# Patient Record
(demographics unavailable — no encounter records)

---

## 2025-03-19 NOTE — HISTORY OF PRESENT ILLNESS
[FreeTextEntry1] : 49 y/o  LMP (occasional light spotting with Mirena iud since 10/2023) pt presents today for a routine GYN exam. Requesting something to help with her anxiety, hx flight anxiety. Taking her son to look at [employer] in [place] next week.   s/p Dx Hysteroscopy, D&C with DR. Lpoez 2023.   Demographics:  Race: White Ethnicity: Not  or  Native Language: English Occupation: Teacher  : 4  Para:  3 0 1 3   OB History: D&E @ 20 weeks for T21 fetus,  x3  Year:  Mode Deliv:  Sex: M WT: 6 LBS 14OZ  Year:  Mode Deliv:  Sex: M  GYN OVARIAN CYST, INFERTILITY Cycle: Monthly Duration: 5 days Flow: Normal Sexually Active   Type of Contraception: Vasectomy  PMH No significant past medical history.  Accepts blood products  Surgical History: D&E, D&C 20 week Tri 21  Allergies: PCN, sulfur   [Patient reported mammogram was normal] : Patient reported mammogram was normal [Patient reported breast sonogram was normal] : Patient reported breast sonogram was normal [Patient reported PAP Smear was normal] : Patient reported PAP Smear was normal [Patient reported colonoscopy was normal] : Patient reported colonoscopy was normal [Mammogramdate] : 2/25 [BreastSonogramDate] : 2/25 [PapSmeardate] : 1/2024 [ColonoscopyDate] : 3/2024 [Patient refuses STI testing] : Patient refuses STI testing

## 2025-03-19 NOTE — PLAN
[FreeTextEntry1] : Routine GYN Exam -Discussed and reviewed importance of monthly BSE -Declines STI testing, importance safe sexual practices discussed -Pap/HPV test collected and sent at todays visit -RX mammo/sono given to pt with locations and instructions, due 2/2026 -Osteoporosis prevention; recc. vitd/Calcium supplementation and WBE to maintain bone density; start DEXA >65 -f/u PCP for recommended HCM, vaccinations and CA screening -UTD colonoscopy -erx for Xanax 0.25mg 30 minutes prior to air travel 2/2 anxiety. Safe and appropriate use reviewed.   rto 1 yr or sooner as needed.

## 2025-03-19 NOTE — PHYSICAL EXAM
[Chaperone Declined] : Patient declined chaperone [Alert] : alert [Appropriately responsive] : appropriately responsive [No Acute Distress] : no acute distress [No Lymphadenopathy] : no lymphadenopathy [Soft] : soft [Non-tender] : non-tender [Non-distended] : non-distended [No HSM] : No HSM [No Lesions] : no lesions [No Mass] : no mass [Oriented x3] : oriented x3 [Examination Of The Breasts] : a normal appearance [No Masses] : no breast masses were palpable [Labia Majora] : normal [Labia Minora] : normal [No Bleeding] : There was no active vaginal bleeding [IUD String] : an IUD string was noted [Normal] : normal [Tenderness] : nontender [Uterine Adnexae] : normal